# Patient Record
Sex: FEMALE | Race: WHITE | NOT HISPANIC OR LATINO | URBAN - METROPOLITAN AREA
[De-identification: names, ages, dates, MRNs, and addresses within clinical notes are randomized per-mention and may not be internally consistent; named-entity substitution may affect disease eponyms.]

---

## 2022-05-13 ENCOUNTER — OFFICE (OUTPATIENT)
Dept: URBAN - METROPOLITAN AREA CLINIC 64 | Facility: CLINIC | Age: 52
End: 2022-05-13

## 2022-05-13 VITALS
DIASTOLIC BLOOD PRESSURE: 89 MMHG | SYSTOLIC BLOOD PRESSURE: 131 MMHG | HEART RATE: 94 BPM | WEIGHT: 233 LBS | HEIGHT: 68 IN

## 2022-05-13 DIAGNOSIS — K21.9 GASTRO-ESOPHAGEAL REFLUX DISEASE WITHOUT ESOPHAGITIS: ICD-10-CM

## 2022-05-13 PROCEDURE — 99204 OFFICE O/P NEW MOD 45 MIN: CPT | Performed by: INTERNAL MEDICINE

## 2022-05-13 RX ORDER — NALOXEGOL OXALATE 25 MG/1
25 TABLET, FILM COATED ORAL
Qty: 30 | Refills: 11 | Status: ACTIVE
Start: 2022-05-13

## 2022-05-31 ENCOUNTER — APPOINTMENT (OUTPATIENT)
Dept: CT IMAGING | Facility: HOSPITAL | Age: 52
End: 2022-05-31

## 2022-05-31 ENCOUNTER — HOSPITAL ENCOUNTER (OUTPATIENT)
Facility: HOSPITAL | Age: 52
Setting detail: OBSERVATION
Discharge: HOME | End: 2022-06-01
Attending: EMERGENCY MEDICINE | Admitting: INTERNAL MEDICINE

## 2022-05-31 ENCOUNTER — APPOINTMENT (OUTPATIENT)
Dept: GENERAL RADIOLOGY | Facility: HOSPITAL | Age: 52
End: 2022-05-31

## 2022-05-31 DIAGNOSIS — N39.0 URINARY TRACT INFECTION WITHOUT HEMATURIA, SITE UNSPECIFIED: ICD-10-CM

## 2022-05-31 DIAGNOSIS — R47.81 SLURRED SPEECH: ICD-10-CM

## 2022-05-31 DIAGNOSIS — R53.1 WEAKNESS: Primary | ICD-10-CM

## 2022-05-31 PROBLEM — K21.9 GERD WITHOUT ESOPHAGITIS: Chronic | Status: ACTIVE | Noted: 2022-05-31

## 2022-05-31 PROBLEM — Z72.0 TOBACCO ABUSE: Chronic | Status: ACTIVE | Noted: 2022-05-31

## 2022-05-31 LAB
ALBUMIN SERPL-MCNC: 3.7 G/DL (ref 3.5–5.2)
ALBUMIN/GLOB SERPL: 1.3 G/DL
ALP SERPL-CCNC: 108 U/L (ref 39–117)
ALT SERPL W P-5'-P-CCNC: 33 U/L (ref 1–33)
ANION GAP SERPL CALCULATED.3IONS-SCNC: 11 MMOL/L (ref 5–15)
ANION GAP SERPL CALCULATED.3IONS-SCNC: 12 MMOL/L (ref 5–15)
AST SERPL-CCNC: 25 U/L (ref 1–32)
B PARAPERT DNA SPEC QL NAA+PROBE: NOT DETECTED
B PERT DNA SPEC QL NAA+PROBE: NOT DETECTED
BACTERIA UR QL AUTO: ABNORMAL /HPF
BASOPHILS # BLD AUTO: 0 10*3/MM3 (ref 0–0.2)
BASOPHILS NFR BLD AUTO: 0.3 % (ref 0–1.5)
BILIRUB SERPL-MCNC: 0.4 MG/DL (ref 0–1.2)
BILIRUB UR QL STRIP: NEGATIVE
BUN SERPL-MCNC: 8 MG/DL (ref 6–20)
BUN SERPL-MCNC: 8 MG/DL (ref 6–20)
BUN/CREAT SERPL: 7.2 (ref 7–25)
BUN/CREAT SERPL: 9.3 (ref 7–25)
C PNEUM DNA NPH QL NAA+NON-PROBE: NOT DETECTED
CALCIUM SPEC-SCNC: 8.8 MG/DL (ref 8.6–10.5)
CALCIUM SPEC-SCNC: 9 MG/DL (ref 8.6–10.5)
CHLORIDE SERPL-SCNC: 101 MMOL/L (ref 98–107)
CHLORIDE SERPL-SCNC: 105 MMOL/L (ref 98–107)
CHOLEST SERPL-MCNC: 248 MG/DL (ref 0–200)
CK SERPL-CCNC: 365 U/L (ref 20–180)
CLARITY UR: ABNORMAL
CO2 SERPL-SCNC: 23 MMOL/L (ref 22–29)
CO2 SERPL-SCNC: 23 MMOL/L (ref 22–29)
COLOR UR: ABNORMAL
CREAT SERPL-MCNC: 0.86 MG/DL (ref 0.57–1)
CREAT SERPL-MCNC: 1.11 MG/DL (ref 0.57–1)
D-LACTATE SERPL-SCNC: 0.4 MMOL/L (ref 0.5–2)
D-LACTATE SERPL-SCNC: 0.8 MMOL/L (ref 0.5–2)
DEPRECATED RDW RBC AUTO: 43.8 FL (ref 37–54)
EGFRCR SERPLBLD CKD-EPI 2021: 59.9 ML/MIN/1.73
EGFRCR SERPLBLD CKD-EPI 2021: 81.4 ML/MIN/1.73
EOSINOPHIL # BLD AUTO: 0.4 10*3/MM3 (ref 0–0.4)
EOSINOPHIL NFR BLD AUTO: 2.8 % (ref 0.3–6.2)
ERYTHROCYTE [DISTWIDTH] IN BLOOD BY AUTOMATED COUNT: 13.9 % (ref 12.3–15.4)
FLUAV SUBTYP SPEC NAA+PROBE: NOT DETECTED
FLUBV RNA ISLT QL NAA+PROBE: NOT DETECTED
GLOBULIN UR ELPH-MCNC: 2.9 GM/DL
GLUCOSE SERPL-MCNC: 109 MG/DL (ref 65–99)
GLUCOSE SERPL-MCNC: 92 MG/DL (ref 65–99)
GLUCOSE UR STRIP-MCNC: NEGATIVE MG/DL
HADV DNA SPEC NAA+PROBE: NOT DETECTED
HCOV 229E RNA SPEC QL NAA+PROBE: NOT DETECTED
HCOV HKU1 RNA SPEC QL NAA+PROBE: NOT DETECTED
HCOV NL63 RNA SPEC QL NAA+PROBE: NOT DETECTED
HCOV OC43 RNA SPEC QL NAA+PROBE: NOT DETECTED
HCT VFR BLD AUTO: 37 % (ref 34–46.6)
HDLC SERPL-MCNC: 48 MG/DL (ref 40–60)
HGB BLD-MCNC: 12.1 G/DL (ref 12–15.9)
HGB UR QL STRIP.AUTO: NEGATIVE
HMPV RNA NPH QL NAA+NON-PROBE: NOT DETECTED
HPIV1 RNA ISLT QL NAA+PROBE: NOT DETECTED
HPIV2 RNA SPEC QL NAA+PROBE: NOT DETECTED
HPIV3 RNA NPH QL NAA+PROBE: NOT DETECTED
HPIV4 P GENE NPH QL NAA+PROBE: NOT DETECTED
HYALINE CASTS UR QL AUTO: ABNORMAL /LPF
KETONES UR QL STRIP: ABNORMAL
LDLC SERPL CALC-MCNC: 168 MG/DL (ref 0–100)
LDLC/HDLC SERPL: 3.44 {RATIO}
LEUKOCYTE ESTERASE UR QL STRIP.AUTO: ABNORMAL
LYMPHOCYTES # BLD AUTO: 2.4 10*3/MM3 (ref 0.7–3.1)
LYMPHOCYTES NFR BLD AUTO: 17.3 % (ref 19.6–45.3)
M PNEUMO IGG SER IA-ACNC: NOT DETECTED
MAGNESIUM SERPL-MCNC: 1.8 MG/DL (ref 1.6–2.6)
MCH RBC QN AUTO: 29.2 PG (ref 26.6–33)
MCHC RBC AUTO-ENTMCNC: 32.6 G/DL (ref 31.5–35.7)
MCV RBC AUTO: 89.6 FL (ref 79–97)
MONOCYTES # BLD AUTO: 0.7 10*3/MM3 (ref 0.1–0.9)
MONOCYTES NFR BLD AUTO: 5.3 % (ref 5–12)
NEUTROPHILS NFR BLD AUTO: 10.4 10*3/MM3 (ref 1.7–7)
NEUTROPHILS NFR BLD AUTO: 74.3 % (ref 42.7–76)
NITRITE UR QL STRIP: POSITIVE
NRBC BLD AUTO-RTO: 0.2 /100 WBC (ref 0–0.2)
NT-PROBNP SERPL-MCNC: 357.5 PG/ML (ref 0–900)
PH UR STRIP.AUTO: 5.5 [PH] (ref 5–8)
PHOSPHATE SERPL-MCNC: 2.8 MG/DL (ref 2.5–4.5)
PLATELET # BLD AUTO: 193 10*3/MM3 (ref 140–450)
PMV BLD AUTO: 8.5 FL (ref 6–12)
POTASSIUM SERPL-SCNC: 3.4 MMOL/L (ref 3.5–5.2)
POTASSIUM SERPL-SCNC: 3.6 MMOL/L (ref 3.5–5.2)
PROCALCITONIN SERPL-MCNC: 0.92 NG/ML (ref 0–0.25)
PROT SERPL-MCNC: 6.6 G/DL (ref 6–8.5)
PROT UR QL STRIP: ABNORMAL
RBC # BLD AUTO: 4.13 10*6/MM3 (ref 3.77–5.28)
RBC # UR STRIP: ABNORMAL /HPF
REF LAB TEST METHOD: ABNORMAL
RHINOVIRUS RNA SPEC NAA+PROBE: NOT DETECTED
RSV RNA NPH QL NAA+NON-PROBE: NOT DETECTED
SARS-COV-2 RNA NPH QL NAA+NON-PROBE: NOT DETECTED
SODIUM SERPL-SCNC: 136 MMOL/L (ref 136–145)
SODIUM SERPL-SCNC: 139 MMOL/L (ref 136–145)
SP GR UR STRIP: 1.02 (ref 1–1.03)
SQUAMOUS #/AREA URNS HPF: ABNORMAL /HPF
TRIGL SERPL-MCNC: 175 MG/DL (ref 0–150)
TROPONIN T SERPL-MCNC: <0.01 NG/ML (ref 0–0.03)
TSH SERPL DL<=0.05 MIU/L-ACNC: 1.4 UIU/ML (ref 0.27–4.2)
UROBILINOGEN UR QL STRIP: ABNORMAL
VLDLC SERPL-MCNC: 32 MG/DL (ref 5–40)
WBC # UR STRIP: ABNORMAL /HPF
WBC NRBC COR # BLD: 14.1 10*3/MM3 (ref 3.4–10.8)

## 2022-05-31 PROCEDURE — 99285 EMERGENCY DEPT VISIT HI MDM: CPT

## 2022-05-31 PROCEDURE — G0378 HOSPITAL OBSERVATION PER HR: HCPCS

## 2022-05-31 PROCEDURE — 83605 ASSAY OF LACTIC ACID: CPT | Performed by: INTERNAL MEDICINE

## 2022-05-31 PROCEDURE — 96375 TX/PRO/DX INJ NEW DRUG ADDON: CPT

## 2022-05-31 PROCEDURE — 80048 BASIC METABOLIC PNL TOTAL CA: CPT | Performed by: INTERNAL MEDICINE

## 2022-05-31 PROCEDURE — 83036 HEMOGLOBIN GLYCOSYLATED A1C: CPT | Performed by: INTERNAL MEDICINE

## 2022-05-31 PROCEDURE — 25010000002 CEFTRIAXONE PER 250 MG: Performed by: EMERGENCY MEDICINE

## 2022-05-31 PROCEDURE — 70450 CT HEAD/BRAIN W/O DYE: CPT

## 2022-05-31 PROCEDURE — 84484 ASSAY OF TROPONIN QUANT: CPT | Performed by: EMERGENCY MEDICINE

## 2022-05-31 PROCEDURE — 82550 ASSAY OF CK (CPK): CPT | Performed by: INTERNAL MEDICINE

## 2022-05-31 PROCEDURE — 87086 URINE CULTURE/COLONY COUNT: CPT | Performed by: EMERGENCY MEDICINE

## 2022-05-31 PROCEDURE — 25010000002 METOCLOPRAMIDE PER 10 MG: Performed by: EMERGENCY MEDICINE

## 2022-05-31 PROCEDURE — 87088 URINE BACTERIA CULTURE: CPT | Performed by: EMERGENCY MEDICINE

## 2022-05-31 PROCEDURE — 83735 ASSAY OF MAGNESIUM: CPT | Performed by: INTERNAL MEDICINE

## 2022-05-31 PROCEDURE — 93005 ELECTROCARDIOGRAM TRACING: CPT | Performed by: EMERGENCY MEDICINE

## 2022-05-31 PROCEDURE — 83605 ASSAY OF LACTIC ACID: CPT

## 2022-05-31 PROCEDURE — 99219 PR INITIAL OBSERVATION CARE/DAY 50 MINUTES: CPT | Performed by: INTERNAL MEDICINE

## 2022-05-31 PROCEDURE — 87186 SC STD MICRODIL/AGAR DIL: CPT | Performed by: EMERGENCY MEDICINE

## 2022-05-31 PROCEDURE — 71045 X-RAY EXAM CHEST 1 VIEW: CPT

## 2022-05-31 PROCEDURE — 0202U NFCT DS 22 TRGT SARS-COV-2: CPT | Performed by: EMERGENCY MEDICINE

## 2022-05-31 PROCEDURE — 80050 GENERAL HEALTH PANEL: CPT | Performed by: INTERNAL MEDICINE

## 2022-05-31 PROCEDURE — 84100 ASSAY OF PHOSPHORUS: CPT | Performed by: INTERNAL MEDICINE

## 2022-05-31 PROCEDURE — 81001 URINALYSIS AUTO W/SCOPE: CPT | Performed by: EMERGENCY MEDICINE

## 2022-05-31 PROCEDURE — 83880 ASSAY OF NATRIURETIC PEPTIDE: CPT | Performed by: INTERNAL MEDICINE

## 2022-05-31 PROCEDURE — 87040 BLOOD CULTURE FOR BACTERIA: CPT | Performed by: EMERGENCY MEDICINE

## 2022-05-31 PROCEDURE — 96374 THER/PROPH/DIAG INJ IV PUSH: CPT

## 2022-05-31 PROCEDURE — 84145 PROCALCITONIN (PCT): CPT | Performed by: INTERNAL MEDICINE

## 2022-05-31 PROCEDURE — 25010000002 ONDANSETRON PER 1 MG: Performed by: EMERGENCY MEDICINE

## 2022-05-31 PROCEDURE — 80061 LIPID PANEL: CPT | Performed by: INTERNAL MEDICINE

## 2022-05-31 RX ORDER — AMOXICILLIN 250 MG
2 CAPSULE ORAL 2 TIMES DAILY
Status: DISCONTINUED | OUTPATIENT
Start: 2022-05-31 | End: 2022-06-01 | Stop reason: HOSPADM

## 2022-05-31 RX ORDER — ROSUVASTATIN CALCIUM 10 MG/1
10 TABLET, COATED ORAL DAILY
COMMUNITY
End: 2022-06-01 | Stop reason: HOSPADM

## 2022-05-31 RX ORDER — SODIUM CHLORIDE 0.9 % (FLUSH) 0.9 %
10 SYRINGE (ML) INJECTION AS NEEDED
Status: DISCONTINUED | OUTPATIENT
Start: 2022-05-31 | End: 2022-06-01 | Stop reason: HOSPADM

## 2022-05-31 RX ORDER — PROMETHAZINE HYDROCHLORIDE 25 MG/1
25 TABLET ORAL 3 TIMES DAILY PRN
COMMUNITY

## 2022-05-31 RX ORDER — OXYCODONE AND ACETAMINOPHEN 10; 325 MG/1; MG/1
1 TABLET ORAL EVERY 4 HOURS PRN
COMMUNITY

## 2022-05-31 RX ORDER — ONDANSETRON 2 MG/ML
8 INJECTION INTRAMUSCULAR; INTRAVENOUS ONCE
Status: COMPLETED | OUTPATIENT
Start: 2022-05-31 | End: 2022-05-31

## 2022-05-31 RX ORDER — AMITRIPTYLINE HYDROCHLORIDE 150 MG/1
300 TABLET, FILM COATED ORAL NIGHTLY
COMMUNITY

## 2022-05-31 RX ORDER — DICYCLOMINE HYDROCHLORIDE 10 MG/1
10 CAPSULE ORAL 4 TIMES DAILY
COMMUNITY

## 2022-05-31 RX ORDER — TIZANIDINE HYDROCHLORIDE 4 MG/1
4 CAPSULE, GELATIN COATED ORAL EVERY 8 HOURS
COMMUNITY

## 2022-05-31 RX ORDER — SUMATRIPTAN 100 MG/1
100 TABLET, FILM COATED ORAL 2 TIMES DAILY PRN
COMMUNITY

## 2022-05-31 RX ORDER — ASPIRIN 325 MG
325 TABLET ORAL ONCE
Status: COMPLETED | OUTPATIENT
Start: 2022-05-31 | End: 2022-05-31

## 2022-05-31 RX ORDER — ENOXAPARIN SODIUM 100 MG/ML
40 INJECTION SUBCUTANEOUS
Status: DISCONTINUED | OUTPATIENT
Start: 2022-05-31 | End: 2022-06-01 | Stop reason: HOSPADM

## 2022-05-31 RX ORDER — HYDROCODONE BITARTRATE AND ACETAMINOPHEN 5; 325 MG/1; MG/1
1 TABLET ORAL EVERY 4 HOURS PRN
Status: DISCONTINUED | OUTPATIENT
Start: 2022-05-31 | End: 2022-06-01 | Stop reason: HOSPADM

## 2022-05-31 RX ORDER — NITROGLYCERIN 0.4 MG/1
0.4 TABLET SUBLINGUAL
Status: DISCONTINUED | OUTPATIENT
Start: 2022-05-31 | End: 2022-06-01 | Stop reason: HOSPADM

## 2022-05-31 RX ORDER — NALOXEGOL OXALATE 25 MG/1
25 TABLET, FILM COATED ORAL EVERY MORNING
COMMUNITY

## 2022-05-31 RX ORDER — GABAPENTIN 600 MG/1
600 TABLET ORAL 3 TIMES DAILY
COMMUNITY

## 2022-05-31 RX ORDER — BISACODYL 10 MG
10 SUPPOSITORY, RECTAL RECTAL DAILY PRN
Status: DISCONTINUED | OUTPATIENT
Start: 2022-05-31 | End: 2022-06-01 | Stop reason: HOSPADM

## 2022-05-31 RX ORDER — ESCITALOPRAM OXALATE 20 MG/1
20 TABLET ORAL NIGHTLY
COMMUNITY
Start: 2014-12-15

## 2022-05-31 RX ORDER — BISACODYL 5 MG/1
5 TABLET, DELAYED RELEASE ORAL DAILY PRN
Status: DISCONTINUED | OUTPATIENT
Start: 2022-05-31 | End: 2022-06-01 | Stop reason: HOSPADM

## 2022-05-31 RX ORDER — HYDROXYZINE PAMOATE 100 MG/1
200 CAPSULE ORAL NIGHTLY
COMMUNITY
End: 2022-06-01 | Stop reason: HOSPADM

## 2022-05-31 RX ORDER — POLYETHYLENE GLYCOL 3350 17 G/17G
17 POWDER, FOR SOLUTION ORAL DAILY PRN
Status: DISCONTINUED | OUTPATIENT
Start: 2022-05-31 | End: 2022-06-01 | Stop reason: HOSPADM

## 2022-05-31 RX ORDER — FAMOTIDINE 20 MG/1
40 TABLET, FILM COATED ORAL DAILY
Status: DISCONTINUED | OUTPATIENT
Start: 2022-06-01 | End: 2022-06-01 | Stop reason: HOSPADM

## 2022-05-31 RX ORDER — METOCLOPRAMIDE HYDROCHLORIDE 5 MG/ML
10 INJECTION INTRAMUSCULAR; INTRAVENOUS ONCE
Status: COMPLETED | OUTPATIENT
Start: 2022-05-31 | End: 2022-05-31

## 2022-05-31 RX ORDER — SUCRALFATE 1 G/1
1 TABLET ORAL 4 TIMES DAILY
COMMUNITY

## 2022-05-31 RX ORDER — SODIUM CHLORIDE 0.9 % (FLUSH) 0.9 %
10 SYRINGE (ML) INJECTION EVERY 12 HOURS SCHEDULED
Status: DISCONTINUED | OUTPATIENT
Start: 2022-05-31 | End: 2022-06-01 | Stop reason: HOSPADM

## 2022-05-31 RX ORDER — DULOXETIN HYDROCHLORIDE 30 MG/1
90 CAPSULE, DELAYED RELEASE ORAL 3 TIMES DAILY
COMMUNITY

## 2022-05-31 RX ORDER — TOPIRAMATE 50 MG/1
50 TABLET, FILM COATED ORAL DAILY
COMMUNITY

## 2022-05-31 RX ORDER — ONDANSETRON 2 MG/ML
4 INJECTION INTRAMUSCULAR; INTRAVENOUS EVERY 6 HOURS PRN
Status: DISCONTINUED | OUTPATIENT
Start: 2022-05-31 | End: 2022-06-01 | Stop reason: HOSPADM

## 2022-05-31 RX ORDER — ESTRADIOL 1 MG/1
1 TABLET ORAL EVERY MORNING
COMMUNITY

## 2022-05-31 RX ORDER — CHOLECALCIFEROL (VITAMIN D3) 125 MCG
5 CAPSULE ORAL NIGHTLY PRN
Status: DISCONTINUED | OUTPATIENT
Start: 2022-05-31 | End: 2022-06-01 | Stop reason: HOSPADM

## 2022-05-31 RX ADMIN — SODIUM CHLORIDE, POTASSIUM CHLORIDE, SODIUM LACTATE AND CALCIUM CHLORIDE 1000 ML: 600; 310; 30; 20 INJECTION, SOLUTION INTRAVENOUS at 12:44

## 2022-05-31 RX ADMIN — ASPIRIN 325 MG ORAL TABLET 325 MG: 325 PILL ORAL at 16:17

## 2022-05-31 RX ADMIN — WATER 1 G: 100 INJECTION, SOLUTION INTRAVENOUS at 14:57

## 2022-05-31 RX ADMIN — METOCLOPRAMIDE 10 MG: 5 INJECTION, SOLUTION INTRAMUSCULAR; INTRAVENOUS at 16:17

## 2022-05-31 RX ADMIN — HYDROCODONE BITARTRATE AND ACETAMINOPHEN 1 TABLET: 5; 325 TABLET ORAL at 22:42

## 2022-05-31 RX ADMIN — ONDANSETRON 8 MG: 2 INJECTION INTRAMUSCULAR; INTRAVENOUS at 14:53

## 2022-06-01 ENCOUNTER — APPOINTMENT (OUTPATIENT)
Dept: CT IMAGING | Facility: HOSPITAL | Age: 52
End: 2022-06-01

## 2022-06-01 ENCOUNTER — APPOINTMENT (OUTPATIENT)
Dept: CARDIOLOGY | Facility: HOSPITAL | Age: 52
End: 2022-06-01

## 2022-06-01 ENCOUNTER — APPOINTMENT (OUTPATIENT)
Dept: MRI IMAGING | Facility: HOSPITAL | Age: 52
End: 2022-06-01

## 2022-06-01 VITALS
WEIGHT: 242 LBS | SYSTOLIC BLOOD PRESSURE: 142 MMHG | HEART RATE: 86 BPM | DIASTOLIC BLOOD PRESSURE: 76 MMHG | OXYGEN SATURATION: 92 % | TEMPERATURE: 98.1 F | RESPIRATION RATE: 23 BRPM | HEIGHT: 68 IN | BODY MASS INDEX: 36.68 KG/M2

## 2022-06-01 LAB
BH CV ECHO MEAS - AO MAX PG: 12.2 MMHG
BH CV ECHO MEAS - AO MEAN PG: 6.7 MMHG
BH CV ECHO MEAS - AO ROOT DIAM: 3.5 CM
BH CV ECHO MEAS - AO V2 MAX: 174.5 CM/SEC
BH CV ECHO MEAS - AO V2 VTI: 35 CM
BH CV ECHO MEAS - AVA(I,D): 1.57 CM2
BH CV ECHO MEAS - EDV(CUBED): 139.8 ML
BH CV ECHO MEAS - EDV(MOD-SP4): 96.6 ML
BH CV ECHO MEAS - EF(MOD-SP4): 51.9 %
BH CV ECHO MEAS - ESV(CUBED): 57.8 ML
BH CV ECHO MEAS - ESV(MOD-SP4): 46.4 ML
BH CV ECHO MEAS - FS: 25.5 %
BH CV ECHO MEAS - IVS/LVPW: 0.96 CM
BH CV ECHO MEAS - IVSD: 1.08 CM
BH CV ECHO MEAS - LA DIMENSION: 4.2 CM
BH CV ECHO MEAS - LV DIASTOLIC VOL/BSA (35-75): 43.6 CM2
BH CV ECHO MEAS - LV MASS(C)D: 219.7 GRAMS
BH CV ECHO MEAS - LV MAX PG: 2.6 MMHG
BH CV ECHO MEAS - LV MEAN PG: 1.37 MMHG
BH CV ECHO MEAS - LV SYSTOLIC VOL/BSA (12-30): 20.9 CM2
BH CV ECHO MEAS - LV V1 MAX: 80.6 CM/SEC
BH CV ECHO MEAS - LV V1 VTI: 15.2 CM
BH CV ECHO MEAS - LVIDD: 5.2 CM
BH CV ECHO MEAS - LVIDS: 3.9 CM
BH CV ECHO MEAS - LVOT AREA: 3.6 CM2
BH CV ECHO MEAS - LVOT DIAM: 2.14 CM
BH CV ECHO MEAS - LVPWD: 1.12 CM
BH CV ECHO MEAS - MR MAX PG: 91.6 MMHG
BH CV ECHO MEAS - MR MAX VEL: 478.4 CM/SEC
BH CV ECHO MEAS - MV A MAX VEL: 75.1 CM/SEC
BH CV ECHO MEAS - MV DEC SLOPE: 664.4 CM/SEC2
BH CV ECHO MEAS - MV DEC TIME: 0.13 MSEC
BH CV ECHO MEAS - MV E MAX VEL: 85.5 CM/SEC
BH CV ECHO MEAS - MV E/A: 1.14
BH CV ECHO MEAS - MV MAX PG: 4.4 MMHG
BH CV ECHO MEAS - MV MEAN PG: 2.19 MMHG
BH CV ECHO MEAS - MV V2 VTI: 27.8 CM
BH CV ECHO MEAS - MVA(VTI): 1.97 CM2
BH CV ECHO MEAS - PA V2 MAX: 92.7 CM/SEC
BH CV ECHO MEAS - PULM A REVS DUR: 0.11 SEC
BH CV ECHO MEAS - PULM A REVS VEL: 24 CM/SEC
BH CV ECHO MEAS - PULM DIAS VEL: 51.8 CM/SEC
BH CV ECHO MEAS - PULM S/D: 1.36
BH CV ECHO MEAS - PULM SYS VEL: 70.3 CM/SEC
BH CV ECHO MEAS - RAP SYSTOLE: 8 MMHG
BH CV ECHO MEAS - RV MAX PG: 1.2 MMHG
BH CV ECHO MEAS - RV V1 MAX: 54.7 CM/SEC
BH CV ECHO MEAS - RV V1 VTI: 11 CM
BH CV ECHO MEAS - RVDD: 2.7 CM
BH CV ECHO MEAS - RVSP: 14.4 MMHG
BH CV ECHO MEAS - SI(MOD-SP4): 22.6 ML/M2
BH CV ECHO MEAS - SV(LVOT): 54.9 ML
BH CV ECHO MEAS - SV(MOD-SP4): 50.2 ML
BH CV ECHO MEAS - TR MAX PG: 6.4 MMHG
BH CV ECHO MEAS - TR MAX VEL: 126.7 CM/SEC
HBA1C MFR BLD: 5.8 % (ref 3.5–5.6)
MAXIMAL PREDICTED HEART RATE: 168 BPM
STRESS TARGET HR: 143 BPM

## 2022-06-01 PROCEDURE — G0378 HOSPITAL OBSERVATION PER HR: HCPCS

## 2022-06-01 PROCEDURE — 97162 PT EVAL MOD COMPLEX 30 MIN: CPT

## 2022-06-01 PROCEDURE — 70496 CT ANGIOGRAPHY HEAD: CPT

## 2022-06-01 PROCEDURE — 99204 OFFICE O/P NEW MOD 45 MIN: CPT | Performed by: PSYCHIATRY & NEUROLOGY

## 2022-06-01 PROCEDURE — 93306 TTE W/DOPPLER COMPLETE: CPT | Performed by: INTERNAL MEDICINE

## 2022-06-01 PROCEDURE — 93306 TTE W/DOPPLER COMPLETE: CPT

## 2022-06-01 PROCEDURE — 70551 MRI BRAIN STEM W/O DYE: CPT

## 2022-06-01 PROCEDURE — 99217 PR OBSERVATION CARE DISCHARGE MANAGEMENT: CPT | Performed by: INTERNAL MEDICINE

## 2022-06-01 PROCEDURE — 0 IOPAMIDOL PER 1 ML: Performed by: PSYCHIATRY & NEUROLOGY

## 2022-06-01 PROCEDURE — 70498 CT ANGIOGRAPHY NECK: CPT

## 2022-06-01 PROCEDURE — 96372 THER/PROPH/DIAG INJ SC/IM: CPT

## 2022-06-01 PROCEDURE — 25010000002 ENOXAPARIN PER 10 MG: Performed by: INTERNAL MEDICINE

## 2022-06-01 RX ORDER — RISPERIDONE 1 MG/1
1 TABLET ORAL NIGHTLY
COMMUNITY
End: 2022-06-01 | Stop reason: HOSPADM

## 2022-06-01 RX ORDER — DICYCLOMINE HYDROCHLORIDE 10 MG/1
10 CAPSULE ORAL 4 TIMES DAILY
Status: DISCONTINUED | OUTPATIENT
Start: 2022-06-01 | End: 2022-06-01 | Stop reason: HOSPADM

## 2022-06-01 RX ORDER — CEFDINIR 300 MG/1
300 CAPSULE ORAL 2 TIMES DAILY
Qty: 14 CAPSULE | Refills: 0 | Status: SHIPPED | OUTPATIENT
Start: 2022-06-01

## 2022-06-01 RX ORDER — POTASSIUM CHLORIDE 750 MG/1
10 TABLET, FILM COATED, EXTENDED RELEASE ORAL 2 TIMES DAILY
Qty: 14 TABLET | Refills: 0 | Status: SHIPPED | OUTPATIENT
Start: 2022-06-01

## 2022-06-01 RX ORDER — ESCITALOPRAM OXALATE 10 MG/1
20 TABLET ORAL NIGHTLY
Status: DISCONTINUED | OUTPATIENT
Start: 2022-06-01 | End: 2022-06-01

## 2022-06-01 RX ORDER — AMITRIPTYLINE HYDROCHLORIDE 50 MG/1
300 TABLET, FILM COATED ORAL NIGHTLY
Status: DISCONTINUED | OUTPATIENT
Start: 2022-06-01 | End: 2022-06-01

## 2022-06-01 RX ORDER — HYDROXYZINE HYDROCHLORIDE 25 MG/1
100 TABLET, FILM COATED ORAL NIGHTLY
Status: DISCONTINUED | OUTPATIENT
Start: 2022-06-01 | End: 2022-06-01 | Stop reason: HOSPADM

## 2022-06-01 RX ORDER — ATORVASTATIN CALCIUM 80 MG/1
80 TABLET, FILM COATED ORAL DAILY
Qty: 90 TABLET | Refills: 2 | Status: SHIPPED | OUTPATIENT
Start: 2022-06-01

## 2022-06-01 RX ORDER — ESTRADIOL 1 MG/1
1 TABLET ORAL EVERY MORNING
Status: DISCONTINUED | OUTPATIENT
Start: 2022-06-02 | End: 2022-06-01 | Stop reason: HOSPADM

## 2022-06-01 RX ORDER — MAGNESIUM OXIDE 400 MG/1
400 TABLET ORAL DAILY
Qty: 30 TABLET | Refills: 0 | Status: SHIPPED | OUTPATIENT
Start: 2022-06-01

## 2022-06-01 RX ORDER — MAGNESIUM OXIDE 400 MG/1
400 TABLET ORAL DAILY
Qty: 30 TABLET | Refills: 0 | Status: SHIPPED | OUTPATIENT
Start: 2022-06-01 | End: 2022-06-01 | Stop reason: SDUPTHER

## 2022-06-01 RX ORDER — PROMETHAZINE HYDROCHLORIDE 25 MG/1
25 TABLET ORAL 3 TIMES DAILY PRN
Status: DISCONTINUED | OUTPATIENT
Start: 2022-06-01 | End: 2022-06-01 | Stop reason: HOSPADM

## 2022-06-01 RX ORDER — GABAPENTIN 600 MG/1
600 TABLET ORAL 3 TIMES DAILY
Status: DISCONTINUED | OUTPATIENT
Start: 2022-06-01 | End: 2022-06-01 | Stop reason: HOSPADM

## 2022-06-01 RX ORDER — OLANZAPINE 10 MG/1
20 TABLET ORAL NIGHTLY
COMMUNITY

## 2022-06-01 RX ORDER — RISPERIDONE 1 MG/1
1 TABLET ORAL NIGHTLY
Status: DISCONTINUED | OUTPATIENT
Start: 2022-06-01 | End: 2022-06-01

## 2022-06-01 RX ORDER — CEFDINIR 300 MG/1
300 CAPSULE ORAL 2 TIMES DAILY
Qty: 14 CAPSULE | Refills: 0 | Status: SHIPPED | OUTPATIENT
Start: 2022-06-01 | End: 2022-06-01 | Stop reason: SDUPTHER

## 2022-06-01 RX ORDER — OMEPRAZOLE 40 MG/1
40 CAPSULE, DELAYED RELEASE ORAL 2 TIMES DAILY
COMMUNITY

## 2022-06-01 RX ORDER — IBUPROFEN 800 MG/1
800 TABLET ORAL EVERY 8 HOURS PRN
COMMUNITY
End: 2022-06-01 | Stop reason: HOSPADM

## 2022-06-01 RX ORDER — SUCRALFATE 1 G/1
1 TABLET ORAL 4 TIMES DAILY
Status: DISCONTINUED | OUTPATIENT
Start: 2022-06-01 | End: 2022-06-01 | Stop reason: HOSPADM

## 2022-06-01 RX ORDER — PANTOPRAZOLE SODIUM 40 MG/1
40 TABLET, DELAYED RELEASE ORAL EVERY MORNING
Status: DISCONTINUED | OUTPATIENT
Start: 2022-06-02 | End: 2022-06-01 | Stop reason: HOSPADM

## 2022-06-01 RX ORDER — ASPIRIN 325 MG
325 TABLET, DELAYED RELEASE (ENTERIC COATED) ORAL DAILY
Qty: 30 TABLET | Refills: 0 | Status: SHIPPED | OUTPATIENT
Start: 2022-06-01 | End: 2022-06-01 | Stop reason: SDUPTHER

## 2022-06-01 RX ORDER — ASPIRIN 325 MG
325 TABLET, DELAYED RELEASE (ENTERIC COATED) ORAL DAILY
Qty: 30 TABLET | Refills: 0 | Status: SHIPPED | OUTPATIENT
Start: 2022-06-01

## 2022-06-01 RX ORDER — OXYCODONE HYDROCHLORIDE 5 MG/1
10 TABLET ORAL EVERY 4 HOURS PRN
Status: DISCONTINUED | OUTPATIENT
Start: 2022-06-01 | End: 2022-06-01 | Stop reason: HOSPADM

## 2022-06-01 RX ORDER — TOPIRAMATE 25 MG/1
50 TABLET ORAL DAILY
Status: DISCONTINUED | OUTPATIENT
Start: 2022-06-01 | End: 2022-06-01 | Stop reason: HOSPADM

## 2022-06-01 RX ORDER — OLANZAPINE 10 MG/1
20 TABLET ORAL NIGHTLY
Status: DISCONTINUED | OUTPATIENT
Start: 2022-06-01 | End: 2022-06-01

## 2022-06-01 RX ORDER — TIZANIDINE 4 MG/1
4 TABLET ORAL EVERY 8 HOURS SCHEDULED
Status: DISCONTINUED | OUTPATIENT
Start: 2022-06-01 | End: 2022-06-01 | Stop reason: HOSPADM

## 2022-06-01 RX ORDER — DULOXETIN HYDROCHLORIDE 30 MG/1
90 CAPSULE, DELAYED RELEASE ORAL 3 TIMES DAILY
Status: DISCONTINUED | OUTPATIENT
Start: 2022-06-01 | End: 2022-06-01 | Stop reason: HOSPADM

## 2022-06-01 RX ORDER — ROSUVASTATIN CALCIUM 10 MG/1
10 TABLET, COATED ORAL DAILY
Status: DISCONTINUED | OUTPATIENT
Start: 2022-06-01 | End: 2022-06-01 | Stop reason: HOSPADM

## 2022-06-01 RX ORDER — POTASSIUM CHLORIDE 750 MG/1
10 TABLET, FILM COATED, EXTENDED RELEASE ORAL 2 TIMES DAILY
Qty: 14 TABLET | Refills: 0 | Status: SHIPPED | OUTPATIENT
Start: 2022-06-01 | End: 2022-06-01 | Stop reason: SDUPTHER

## 2022-06-01 RX ADMIN — HYDROCODONE BITARTRATE AND ACETAMINOPHEN 1 TABLET: 5; 325 TABLET ORAL at 04:13

## 2022-06-01 RX ADMIN — HYDROCODONE BITARTRATE AND ACETAMINOPHEN 1 TABLET: 5; 325 TABLET ORAL at 13:34

## 2022-06-01 RX ADMIN — FAMOTIDINE 40 MG: 20 TABLET ORAL at 10:40

## 2022-06-01 RX ADMIN — ENOXAPARIN SODIUM 40 MG: 100 INJECTION SUBCUTANEOUS at 16:36

## 2022-06-01 RX ADMIN — Medication 10 ML: at 10:41

## 2022-06-01 RX ADMIN — IOPAMIDOL 100 ML: 755 INJECTION, SOLUTION INTRAVENOUS at 11:58

## 2022-06-01 NOTE — NURSING NOTE
"Stroke packet brought into the room, personal risk factors for stroke identified and discussed with the patient include:    Former smoker 41 years 2 to 2.5 packs a day  Elevated blood pressure  Elevated total cholesterol 248, normal 200 or less  Elevated LDL or \"bad\" cholesterol 168, normla is 100 or less  Elevated body mass index is 36, normal is 18-25.    "

## 2022-06-01 NOTE — CASE MANAGEMENT/SOCIAL WORK
Discharge Planning Assessment  [facility]     Patient Name: Nataliia Duenas  MRN: [id number]  Today's Date: 6/1/2022    Admit Date: 5/31/2022     Discharge Needs Assessment     Row Name 06/01/22 1546       Living Environment    People in Home spouse    Name(s) of People in Home Eros (spouse)    Current Living Arrangements home    Primary Care Provided by self    Provides Primary Care For no one    Family Caregiver if Needed spouse    Quality of Family Relationships helpful;involved;supportive    Able to Return to Prior Arrangements yes    Living Arrangement Comments Lives w/spouse       Resource/Environmental Concerns    Resource/Environmental Concerns none    Transportation Concerns none       Transition Planning    Patient/Family Anticipates Transition to home with family    Patient/Family Anticipated Services at Transition none    Transportation Anticipated family or friend will provide       Discharge Needs Assessment    Readmission Within the Last 30 Days no previous admission in last 30 days    Equipment Currently Used at Home none    Concerns to be Addressed discharge planning    Anticipated Changes Related to Illness none    Equipment Needed After Discharge none    Provided Post Acute Provider List? N/A    Provided Post Acute Provider Quality & Resource List? N/A               Discharge Plan     Row Name 06/01/22 1547       Plan    Plan Anticipates DC to home. Lives w/spouse.    Patient/Family in Agreement with Plan yes    Plan Comments CM attempted to visit with patient at 11:40 am; however, she was off the floor. CM called patient's room at 3:48 pm and talked with the spouse (patient unavailable), who assisted with completion of initial assessment. Patient lives w/spouse, who will transport home at discharge. No DME. IADL. Patient drives, but does not work. Home pharmacy is [facility], but patient agreeable to Meds to Bed. CM updated pharmacy. Patient's PCP is TARUN Alvarenga (GIDEON updated in epic).               Continued Care and Services - Admitted Since 5/31/2022    Coordination has not been started for this encounter.       Expected Discharge Date and Time     Expected Discharge Date Expected Discharge Time    Terell 3, 2022          Demographic Summary     Row Name 06/01/22 1545       General Information    Admission Type observation    Arrived From emergency department    Referral Source emergency department    Reason for Consult discharge planning    Preferred Language English       Contact Information    Permission Granted to Share Info With                Functional Status     Row Name 06/01/22 1546       Functional Status    Usual Activity Tolerance good    Current Activity Tolerance good       Functional Status, IADL    Medications independent    Meal Preparation independent    Housekeeping independent    Laundry independent    Shopping independent       Mental Status    General Appearance WDL WDL       Mental Status Summary    Recent Changes in Mental Status/Cognitive Functioning no changes       Employment/    Employment Status unemployed              Phone communication or documentation only - no physical contact with patient or family.    Jenny Song, RN, MSN  Care Manager  [phone]

## 2022-06-01 NOTE — CONSULTS
Primary Care Provider: Provider, No Known     Consult requested by:  Dale Avila MD    Reason for Consultation: Neurological evaluation for slurred speech.     Nataliia Duenas is a 52 y.o. female *    History taken from: patient chart RN    Chief complaint: slurred speech.        SUBJECTIVE:    History of present illness:   The patient is a 52 year old lady with history of 80 pack year of smoking and history of hypertension who was in usual health about 2 days ago who had an episode of slurred speech.  She did not have any difficulty in comprehension.  The patient also had tremors at that time.  She was unable to hold objects.  She also had a fever of 101.  She did not have an similar episodes in the past.  The patient did not have any vision problems, focal weakness, bowel and bladder problems.      Review of Systems   Constitutional: fever.   HENT: Negative.    Eyes: Negative.    Respiratory: Negative.    Cardiovascular: Negative.    Gastrointestinal: Negative.    Genitourinary: Negative.    Musculoskeletal: Negative  Skin: Negative.    Neurological: slurred speech..    Hematological: Negative.    Psychiatric/Behavioral: Negative.        PATIENT HISTORY:  No past medical history on file., No past surgical history on file., No family history on file.,  ,   Medications Prior to Admission   Medication Sig Dispense Refill Last Dose   • escitalopram (Lexapro) 20 MG tablet Take 20 mg by mouth Every Night.      • amitriptyline (ELAVIL) 150 MG tablet Take 150 mg by mouth Every Night.      • dicyclomine (BENTYL) 10 MG capsule Take 10 mg by mouth 4 (Four) Times a Day.      • DULoxetine (CYMBALTA) 30 MG capsule Take 30 mg by mouth 3 (Three) Times a Day.      • estradiol (ESTRACE) 1 MG tablet Take 1 mg by mouth Every Morning.      • gabapentin (NEURONTIN) 600 MG tablet Take 600 mg by mouth 3 (Three) Times a Day.      • hydrOXYzine pamoate (VISTARIL) 100 MG capsule Take 100 mg by mouth Every Night.      • Naloxegol  Oxalate (Movantik) 25 MG tablet Take 25 mg by mouth Every Morning.      • oxyCODONE-acetaminophen (PERCOCET)  MG per tablet Take 1 tablet by mouth Every 4 (Four) Hours As Needed.      • promethazine (PHENERGAN) 25 MG tablet Take 25 mg by mouth 3 (Three) Times a Day As Needed.      • rosuvastatin (CRESTOR) 10 MG tablet Take 10 mg by mouth Daily.      • sucralfate (CARAFATE) 1 g tablet Take 1 g by mouth 4 (Four) Times a Day.      • SUMAtriptan (IMITREX) 100 MG tablet Take 100 mg by mouth 2 (Two) Times a Day As Needed.      • TiZANidine (ZANAFLEX) 4 MG capsule Take 4 mg by mouth Every 8 (Eight) Hours.      • topiramate (TOPAMAX) 50 MG tablet Take 50 mg by mouth Daily.      , Scheduled Meds:  enoxaparin, 40 mg, Subcutaneous, Q24H  famotidine, 40 mg, Oral, Daily  senna-docusate sodium, 2 tablet, Oral, BID  sodium chloride, 10 mL, Intravenous, Q12H    , Continuous Infusions:   , PRN Meds:  •  senna-docusate sodium **AND** polyethylene glycol **AND** bisacodyl **AND** bisacodyl  •  HYDROcodone-acetaminophen  •  melatonin  •  nitroglycerin  •  ondansetron  •  [COMPLETED] Insert peripheral IV **AND** sodium chloride  •  sodium chloride, Allergies:  Patient has no known allergies.    ________________________________________________________        OBJECTIVE:  Upon today's exam, The patient is laying in bed in no apparent distress.  Head NC, AT, Neck supple, trachea midline.  Lungs CTA,  Good pulmonary effort. CV S1-S2 no murmur.  Abdomen soft, non tender.  Ext no edema, no cyanosis.          Neurologic Exam  The patient is awake, alert, oriented to person, place and time.  Name common objects, follow commands.  Speech fluent with good comprehension.  CN VFFC, EOMI, no facial droop. Tongue midline.  Motor 5/5.  Sensory light touch intact.  Reflexes absent, plantar mute.  Cerebellum finger to nose intact.    ________________________________________________________   RESULTS REVIEW:    VITAL SIGNS:   Temp:  [98.1 °F (36.7  °C)-98.9 °F (37.2 °C)] 98.9 °F (37.2 °C)  Heart Rate:  [69-92] 82  Resp:  [16-22] 20  BP: (103-165)/(64-76) 129/66     LABS:  WBC   Date Value Ref Range Status   05/31/2022 14.10 (H) 3.40 - 10.80 10*3/mm3 Final     RBC   Date Value Ref Range Status   05/31/2022 4.13 3.77 - 5.28 10*6/mm3 Final     Hemoglobin   Date Value Ref Range Status   05/31/2022 12.1 12.0 - 15.9 g/dL Final     Hematocrit   Date Value Ref Range Status   05/31/2022 37.0 34.0 - 46.6 % Final     MCV   Date Value Ref Range Status   05/31/2022 89.6 79.0 - 97.0 fL Final     MCH   Date Value Ref Range Status   05/31/2022 29.2 26.6 - 33.0 pg Final     MCHC   Date Value Ref Range Status   05/31/2022 32.6 31.5 - 35.7 g/dL Final     RDW   Date Value Ref Range Status   05/31/2022 13.9 12.3 - 15.4 % Final     RDW-SD   Date Value Ref Range Status   05/31/2022 43.8 37.0 - 54.0 fl Final     MPV   Date Value Ref Range Status   05/31/2022 8.5 6.0 - 12.0 fL Final     Platelets   Date Value Ref Range Status   05/31/2022 193 140 - 450 10*3/mm3 Final     Neutrophil %   Date Value Ref Range Status   05/31/2022 74.3 42.7 - 76.0 % Final     Lymphocyte %   Date Value Ref Range Status   05/31/2022 17.3 (L) 19.6 - 45.3 % Final     Monocyte %   Date Value Ref Range Status   05/31/2022 5.3 5.0 - 12.0 % Final     Eosinophil %   Date Value Ref Range Status   05/31/2022 2.8 0.3 - 6.2 % Final     Basophil %   Date Value Ref Range Status   05/31/2022 0.3 0.0 - 1.5 % Final     Neutrophils, Absolute   Date Value Ref Range Status   05/31/2022 10.40 (H) 1.70 - 7.00 10*3/mm3 Final     Lymphocytes, Absolute   Date Value Ref Range Status   05/31/2022 2.40 0.70 - 3.10 10*3/mm3 Final     Monocytes, Absolute   Date Value Ref Range Status   05/31/2022 0.70 0.10 - 0.90 10*3/mm3 Final     Eosinophils, Absolute   Date Value Ref Range Status   05/31/2022 0.40 0.00 - 0.40 10*3/mm3 Final     Basophils, Absolute   Date Value Ref Range Status   05/31/2022 0.00 0.00 - 0.20 10*3/mm3 Final     nRBC    Date Value Ref Range Status   05/31/2022 0.2 0.0 - 0.2 /100 WBC Final     Glucose   Date Value Ref Range Status   05/31/2022 109 (H) 65 - 99 mg/dL Final     BUN   Date Value Ref Range Status   05/31/2022 8 6 - 20 mg/dL Final     Creatinine   Date Value Ref Range Status   05/31/2022 1.11 (H) 0.57 - 1.00 mg/dL Final     Sodium   Date Value Ref Range Status   05/31/2022 139 136 - 145 mmol/L Final     Potassium   Date Value Ref Range Status   05/31/2022 3.4 (L) 3.5 - 5.2 mmol/L Final     Chloride   Date Value Ref Range Status   05/31/2022 105 98 - 107 mmol/L Final     CO2   Date Value Ref Range Status   05/31/2022 23.0 22.0 - 29.0 mmol/L Final     Calcium   Date Value Ref Range Status   05/31/2022 9.0 8.6 - 10.5 mg/dL Final     Total Protein   Date Value Ref Range Status   05/31/2022 6.6 6.0 - 8.5 g/dL Final     Albumin   Date Value Ref Range Status   05/31/2022 3.70 3.50 - 5.20 g/dL Final     ALT (SGPT)   Date Value Ref Range Status   05/31/2022 33 1 - 33 U/L Final     AST (SGOT)   Date Value Ref Range Status   05/31/2022 25 1 - 32 U/L Final     Alkaline Phosphatase   Date Value Ref Range Status   05/31/2022 108 39 - 117 U/L Final     Total Bilirubin   Date Value Ref Range Status   05/31/2022 0.4 0.0 - 1.2 mg/dL Final     BUN/Creatinine Ratio   Date Value Ref Range Status   05/31/2022 7.2 7.0 - 25.0 Final     Anion Gap   Date Value Ref Range Status   05/31/2022 11.0 5.0 - 15.0 mmol/L Final       Lab Results   Component Value Date    TSH 1.400 05/31/2022     (H) 05/31/2022         IMAGING STUDIES:  CT Head Without Contrast    Result Date: 5/31/2022  Normal CT of the brain without contrast.  Electronically Signed By-Gwen Woodson MD On:5/31/2022 3:15 PM This report was finalized on [id number] by  Gwen Woodson MD.    XR Chest 1 View    Result Date: 5/31/2022  Mild cardiomegaly, which is new since the 2017. There are no overt changes of pulmonary edema. Linear densities in the lung bases favored to  represent subsegmental atelectasis.  Electronically Signed By-Gwen Woodson MD On:5/31/2022 12:56 PM This report was finalized on [id number] by  Gwen Woodson MD.      I reviewed the patient's new clinical results.      ________________________________________________________     PROBLEM LIST:    Weakness    GERD without esophagitis    Tobacco abuse    Slurred speech    Acute UTI (urinary tract infection)          Assessment & Plan   ASSESSMENT/PLAN:  The patient is a 52 year old lady with multiple medical problems including GERD, acute UTI, history of tobacco abuse who had an episode of slurred speech and some weakness.  Her symptoms have resolved.  The clinical features are suggestive of TIA.   Rec:  Will obtain MRI of Brain without contrast.  CTA of Head and Neck.  Blood work up for Vitamin B12.  Will continue ASA.  Will follow.   GERD, UTI , tobacco abuse as per hospitalist, MD.   Modification of stroke risk factors:   - Blood pressure should be less than 130/80 outpatient, HbA1c less than 6.5, LDL less than 70; b12>500 and smoking cessation if applicable. We would be grateful if the primary team / primary care physician would keep a close watch on the above targets.  - Stroke education  - Follow up with neurologist of choice      I discussed the patient's findings and my recommendations with patient and nursing staff    Osman Valente MD  06/01/22  09:57 EDT

## 2022-06-01 NOTE — DISCHARGE INSTRUCTIONS
"Dr. Valente, Neurologist Stroke states, you \"had an episode of slurred speech and some weakness.  Her symptoms have resolved.  The clinical features are suggestive of TIA.\"  This is a transient ischemic attack.  Stroke packet brought into the room, personal risk factors for stroke identified and discussed with the patient include:     Former smoker 41 years 2 to 2.5 packs a day  Elevated blood pressure  Elevated total cholesterol 248, normal 200 or less  Elevated LDL or \"bad\" cholesterol 168, normla is 100 or less  Elevated body mass index is 36, normal is 18-25.     "

## 2022-06-01 NOTE — DISCHARGE SUMMARY
[facility]  DISCHARGE SUMMARY    Patient Name: Nataliia Duenas  : 1970  MRN: [id number]    Date of Admission: 2022  Discharge Diagnosis: Weakness  Date of Discharge:  22  Primary Care Physician: Marcia Alvarenga MD    Presenting Problem:   Slurred speech [R47.81]  Weakness [R53.1]  Urinary tract infection without hematuria, site unspecified [N39.0]    Active and Resolved Hospital Problems:  Active Hospital Problems    Diagnosis POA   • **Weakness [R53.1] Yes   • GERD without esophagitis [K21.9] Unknown   • Tobacco abuse [Z72.0] Unknown   • Slurred speech [R47.81] Yes   • Acute UTI (urinary tract infection) [N39.0] Yes      Resolved Hospital Problems   No resolved problems to display.   Possible TIA  neurology consulted  Neuro-check  Pt/ot  Continue aspirin and Crestor  IV rocephin  GI/DVT prophylaxis     UTI Rocephin continue Omnicef  Hospital Course     Hospital Course:  History of Present Illness: Nataliia Duenas is a 52 y.o. female who presented to [facility] on 2022 complaining of weakness, dizziness, symptoms started  with slurred speech,  Dizziness and difficulty ambulating, she started having tremors in her hands and dropping things.  And had a fever up to 101.  She denied any paralysis.  She denied any severe headache.  She denied any facial droop no chest pain neck arm or jaw pain.  She denies any vomiting or diarrhea.  No injury.  Nothing really makes this better or worse ongoing continuous since yesterday about 24 hours moderate to severe.  Generalized weakness and body aches.  No tick bites or rashes no dysuria or frequency.  No cough congestion out of ordinary.  States she recently quit smoking.  No rashes.  No abdominal pain.  Location is diffuse moderate severe ongoing continuous 24 hours she has been at home and associated with the above     CT Head Without Contrast  Normal CT of the brain without  contrast.  Electronically Signed By-Gwen Woodson MD On:5/31/2022 3:15 PM This report was finalized on [id number] by  Gwen Woodson MD.     XR Chest 1 View  Mild cardiomegaly, which is new since the 2017. There are no overt changes of pulmonary edema. Linear densities in the lung bases favored to represent subsegmental atelectasis.      6/1/22 doing better today, will dc home, continue meds per MAR, condition on dc stable.    DISCHARGE Follow Up Recommendations for labs and diagnostics: Follow-up with PCP in a week follow-up with neurology neck schedule appointment      Reasons For Change In Medications and Indications for New Medications:  Aspirin  Mag ox  kcl  Day of Discharge     Vital Signs:  Temp:  [98.1 °F (36.7 °C)-99.1 °F (37.3 °C)] 98.1 °F (36.7 °C)  Heart Rate:  [76-92] 86  Resp:  [15-23] 23  BP: (129-168)/(66-94) 142/76    Physical Exam   HENT:      Head: Normocephalic and atraumatic.      Nose: Nose normal. No congestion or rhinorrhea.      Mouth/Throat:      Mouth: Mucous membranes are moist.      Pharynx: No oropharyngeal exudate.   Eyes:      General: No scleral icterus.        Right eye: No discharge.         Left eye: No discharge.      Extraocular Movements: Extraocular movements intact.      Conjunctiva/sclera: Conjunctivae normal.      Pupils: Pupils are equal, round, and reactive to light.   Neck:      Thyroid: No thyromegaly.      Vascular: No carotid bruit or JVD.      Trachea: No tracheal deviation.   Cardiovascular:      Rate and Rhythm: Normal rate and regular rhythm.      Pulses: Normal pulses.      Heart sounds: Normal heart sounds. No murmur heard.    No friction rub. No gallop.   Pulmonary:      Effort: Pulmonary effort is normal. No respiratory distress.      Breath sounds: Normal breath sounds. No stridor. No wheezing, rhonchi or rales.   Chest:      Chest wall: No tenderness.   Abdominal:      General: Bowel sounds are normal. There is no distension.      Palpations: Abdomen is  soft. There is no mass.      Tenderness: There is no abdominal tenderness. There is no guarding or rebound.      Hernia: No hernia is present.   Musculoskeletal:         General: Tenderness present. No swelling or deformity. Normal range of motion.      Cervical back: Normal range of motion and neck supple. No rigidity. No muscular tenderness.      Right lower leg: No edema.   Lymphadenopathy:      Cervical: No cervical adenopathy.   Skin:     General: Skin is warm and dry.      Coloration: Skin is not jaundiced or pale.      Findings: No bruising, erythema or rash.   Neurological:      General: No focal deficit present.      Mental Status: She is alert and oriented to person, place, and time. Mental status is at baseline.      Cranial Nerves: No cranial nerve deficit.      Sensory: No sensory deficit.      Motor: No weakness or abnormal muscle tone.      Coordination: Coordination normal.   Psychiatric:         Mood and Affect: Mood normal.         Behavior: Behavior normal.         Thought Content: Thought content normal.         Judgment: Judgment normal.           Pertinent  and/or Most Recent Results     LAB RESULTS:      Lab 05/31/22  2256 05/31/22  1250 05/31/22  1245   WBC  --   --  14.10*   HEMOGLOBIN  --   --  12.1   HEMATOCRIT  --   --  37.0   PLATELETS  --   --  193   NEUTROS ABS  --   --  10.40*   LYMPHS ABS  --   --  2.40   MONOS ABS  --   --  0.70   EOS ABS  --   --  0.40   MCV  --   --  89.6   PROCALCITONIN 0.92*  --   --    LACTATE 0.8 0.4*  --          Lab 05/31/22  2256 05/31/22  1245   SODIUM 139 136   POTASSIUM 3.4* 3.6   CHLORIDE 105 101   CO2 23.0 23.0   ANION GAP 11.0 12.0   BUN 8 8   CREATININE 1.11* 0.86   EGFR 59.9* 81.4   GLUCOSE 109* 92   CALCIUM 9.0 8.8   MAGNESIUM 1.8  --    PHOSPHORUS 2.8  --    HEMOGLOBIN A1C 5.8*  --    TSH 1.400  --          Lab 05/31/22  1245   TOTAL PROTEIN 6.6   ALBUMIN 3.70   GLOBULIN 2.9   ALT (SGPT) 33   AST (SGOT) 25   BILIRUBIN 0.4   ALK PHOS 108          Lab 05/31/22 2256 05/31/22  1245   PROBNP 357.5  --    TROPONIN T  --  <0.010         Lab 05/31/22 2256   CHOLESTEROL 248*   LDL CHOL 168*   HDL CHOL 48   TRIGLYCERIDES 175*             Brief Urine Lab Results  (Last result in the past 365 days)      Color   Clarity   Blood   Leuk Est   Nitrite   Protein   CREAT   Urine HCG        05/31/22 1342 Dark Yellow   Cloudy  Comment: Result checked   Negative   Large (3+)   Positive   Trace               Microbiology Results (last 10 days)     Procedure Component Value - Date/Time    Urine Culture - Urine, Urine, Clean Catch [[id number]]  (Abnormal) Collected: 05/31/22 1342    Lab Status: Preliminary result Specimen: Urine, Clean Catch Updated: 06/01/22 1331     Urine Culture >100,000 CFU/mL Escherichia coli    Narrative:      Colonization of the urinary tract without infection is common. Treatment is discouraged unless the patient is symptomatic, pregnant, or undergoing an invasive urologic procedure.    Blood Culture - Blood, Arm, Left [[id number]]  (Normal) Collected: 05/31/22 1322    Lab Status: Preliminary result Specimen: Blood from Arm, Left Updated: 06/01/22 1332     Blood Culture No growth at 24 hours    Respiratory Panel PCR w/COVID-19(SARS-CoV-2) BETO/AVIVA/CHANDNI/PAD/COR/MAD/JACQUES In-House, NP Swab in UTM/VTM, 3-4 HR TAT - Swab, Nasopharynx [[id number]]  (Normal) Collected: 05/31/22 1246    Lab Status: Final result Specimen: Swab from Nasopharynx Updated: 05/31/22 1340     ADENOVIRUS, PCR Not Detected     Coronavirus 229E Not Detected     Coronavirus HKU1 Not Detected     Coronavirus NL63 Not Detected     Coronavirus OC43 Not Detected     COVID19 Not Detected     Human Metapneumovirus Not Detected     Human Rhinovirus/Enterovirus Not Detected     Influenza A PCR Not Detected     Influenza B PCR Not Detected     Parainfluenza Virus 1 Not Detected     Parainfluenza Virus 2 Not Detected     Parainfluenza Virus 3 Not Detected     Parainfluenza Virus 4 Not Detected     RSV,  PCR Not Detected     Bordetella pertussis pcr Not Detected     Bordetella parapertussis PCR Not Detected     Chlamydophila pneumoniae PCR Not Detected     Mycoplasma pneumo by PCR Not Detected    Narrative:      In the setting of a positive respiratory panel with a viral infection PLUS a negative procalcitonin without other underlying concern for bacterial infection, consider observing off antibiotics or discontinuation of antibiotics and continue supportive care. If the respiratory panel is positive for atypical bacterial infection (Bordetella pertussis, Chlamydophila pneumoniae, or Mycoplasma pneumoniae), consider antibiotic de-escalation to target atypical bacterial infection.    Blood Culture - Blood, Arm, Right [[id number]]  (Normal) Collected: 05/31/22 1245    Lab Status: Preliminary result Specimen: Blood from Arm, Right Updated: 06/01/22 1304     Blood Culture No growth at 24 hours          CT Head Without Contrast    Result Date: 5/31/2022  Impression: Normal CT of the brain without contrast.  Electronically Signed By-Gwen Woodson MD On:5/31/2022 3:15 PM This report was finalized on [id number] by  Gwen Woodson MD.    MRI Brain Without Contrast    Result Date: 6/1/2022  Impression:  Normal brain MRI examination.   Electronically Signed By-Jd Crespo MD On:6/1/2022 3:15 PM This report was finalized on [id number] by  Jd Crespo MD.    XR Chest 1 View    Result Date: 5/31/2022  Impression: Mild cardiomegaly, which is new since the 2017. There are no overt changes of pulmonary edema. Linear densities in the lung bases favored to represent subsegmental atelectasis.  Electronically Signed By-Gwen Woodson MD On:5/31/2022 12:56 PM This report was finalized on [id number] by  Gwen Woodson MD.    CT Angiogram Carotids    Result Date: 6/1/2022  Impression: No intra or extracranial stenoses, aneurysms, or occlusions.  Electronically Signed By-Toy Sheikh MD On:6/1/2022 4:24 PM This report was  finalized on [id number] by  Toy Sheikh MD.    CT Angiogram Head    Result Date: 6/1/2022  Impression: No intra or extracranial stenoses, aneurysms, or occlusions.  Electronically Signed By-Toy Sheikh MD On:6/1/2022 4:24 PM This report was finalized on [id number] by  Toy Sheikh MD.            Labs Pending at Discharge:  Pending Labs     Order Current Status    Blood Culture - Blood, Arm, Left Preliminary result    Blood Culture - Blood, Arm, Right Preliminary result    Urine Culture - Urine, Urine, Clean Catch Preliminary result        Procedures Performed         Consults:   Consults     Date and Time Order Name Status Description    5/31/2022 10:31 PM Inpatient Neurology Consult General Completed     5/31/2022  3:42 PM Inpatient Neurology Consult Other (see comments) (Slurring words)      5/31/2022  3:42 PM Hospitalist (on-call MD unless specified)          ASSESSMENT/PLAN:  The patient is a 52 year old lady with multiple medical problems including GERD, acute UTI, history of tobacco abuse who had an episode of slurred speech and some weakness.  Her symptoms have resolved.  The clinical features are suggestive of TIA.   Rec:  Will obtain MRI of Brain without contrast.  CTA of Head and Neck.  Blood work up for Vitamin B12.  Will continue ASA.  Will follow.   GERD, UTI , tobacco abuse as per hospitalMD osiel.   Modification of stroke risk factors:   - Blood pressure should be less than 130/80 outpatient, HbA1c less than 6.5, LDL less than 70; b12>500 and smoking cessation if applicable. We would be grateful if the primary team / primary care physician would keep a close watch on the above targets.  - Stroke education  - Follow up with neurologist of choice        I discussed the patient's findings and my recommendations with patient and nursing staff     Osman Valente MD    Discharge Details        Discharge Medications      New Medications      Instructions Start Date   aspirin  MG tablet    325 mg, Oral, Daily      cefdinir 300 MG capsule  Commonly known as: OMNICEF   300 mg, Oral, 2 Times Daily      magnesium oxide 400 MG tablet  Commonly known as: MAG-OX   400 mg, Oral, Daily      potassium chloride 10 MEQ CR tablet   10 mEq, Oral, 2 Times Daily         Continue These Medications      Instructions Start Date   amitriptyline 150 MG tablet  Commonly known as: ELAVIL   300 mg, Oral, Nightly      dicyclomine 10 MG capsule  Commonly known as: BENTYL   10 mg, Oral, 4 Times Daily      DULoxetine 30 MG capsule  Commonly known as: CYMBALTA   90 mg, Oral, 3 Times Daily      escitalopram 20 MG tablet  Commonly known as: LEXAPRO   20 mg, Oral, Nightly      estradiol 1 MG tablet  Commonly known as: ESTRACE   1 mg, Oral, Every Morning      gabapentin 600 MG tablet  Commonly known as: NEURONTIN   600 mg, Oral, 3 Times Daily      Movantik 25 MG tablet  Generic drug: Naloxegol Oxalate   25 mg, Oral, Every Morning      OLANZapine 10 MG tablet  Commonly known as: zyPREXA   20 mg, Oral, Nightly      omeprazole 40 MG capsule  Commonly known as: priLOSEC   40 mg, Oral, 2 Times Daily      oxyCODONE-acetaminophen  MG per tablet  Commonly known as: PERCOCET   1 tablet, Oral, Every 4 Hours PRN      promethazine 25 MG tablet  Commonly known as: PHENERGAN   25 mg, Oral, 3 Times Daily PRN      rosuvastatin 10 MG tablet  Commonly known as: CRESTOR   10 mg, Oral, Daily      sucralfate 1 g tablet  Commonly known as: CARAFATE   1 g, Oral, 4 Times Daily      SUMAtriptan 100 MG tablet  Commonly known as: IMITREX   100 mg, Oral, 2 Times Daily PRN      TiZANidine 4 MG capsule  Commonly known as: ZANAFLEX   4 mg, Oral, Every 8 Hours      topiramate 50 MG tablet  Commonly known as: TOPAMAX   50 mg, Oral, Daily         Stop These Medications    hydrOXYzine pamoate 100 MG capsule  Commonly known as: VISTARIL     ibuprofen 800 MG tablet  Commonly known as: ADVIL,MOTRIN     risperiDONE 1 MG tablet  Commonly known as: risperDAL           No Known Allergies    Discharge Disposition:   Home or Self Care    Diet:  Hospital:  Diet Order   Procedures   • Diet Regular     Discharge Activity:   Activity Instructions     Gradually Increase Activity Until at Pre-Hospitalization Level          CODE STATUS:  Code Status and Medical Interventions:   Ordered at: 06/01/22 1809     Level Of Support Discussed With:    Patient     Code Status (Patient has no pulse and is not breathing):    CPR (Attempt to Resuscitate)     Medical Interventions (Patient has pulse or is breathing):    Full Support     No future appointments.    Additional Instructions for the Follow-ups that You Need to Schedule     Discharge Follow-up with PCP   As directed       Currently Documented PCP:    Marcia Alvarenga MD    PCP Phone Number:    [phone]     Follow Up Details: 1 week         Discharge Follow-up with Specified Provider: neurology; 2 Weeks   As directed      To: neurology    Follow Up: 2 Weeks             Time spent on Discharge including face to face service:  34 minutes    This patient has been examined wearing appropriate Personal Protective Equipment and discussed with rn. 06/01/22      Signature: Electronically signed by Yung Bates MD, 06/01/22, 6:22 PM EDT.

## 2022-06-01 NOTE — THERAPY EVALUATION
Patient Name: Nataliia Duenas  : 1970    MRN: [id number]                              Today's Date: 2022       Admit Date: 2022    Visit Dx:     ICD-10-CM ICD-9-CM   1. Weakness  R53.1 780.79   2. Slurred speech  R47.81 784.59   3. Urinary tract infection without hematuria, site unspecified  N39.0 599.0     Patient Active Problem List   Diagnosis   • Weakness   • GERD without esophagitis   • Tobacco abuse   • Slurred speech   • Acute UTI (urinary tract infection)     No past medical history on file.  No past surgical history on file.   General Information     Row Name 22 1129          Physical Therapy Time and Intention    Document Type evaluation  -EL     Mode of Treatment individual therapy;physical therapy  -     Row Name 22 1129          General Information    Patient Profile Reviewed yes  -EL     Prior Level of Function independent:;all household mobility;ADL's  -     Row Name 22 1129          Living Environment    People in Home spouse  -     Row Name 22 1129          Home Main Entrance    Number of Stairs, Main Entrance two  -     Row Name 22 1129          Stairs Within Home, Primary    Number of Stairs, Within Home, Primary twelve;other (see comments)  basement, but doesn't need to go down there  -     Row Name 22 1129          Cognition    Orientation Status (Cognition) oriented x 4  -     Row Name 22 1129          Safety Issues, Functional Mobility    Impairments Affecting Function (Mobility) balance  -EL           User Key  (r) = Recorded By, (t) = Taken By, (c) = Cosigned By    Initials Name Provider Type    EL Wellington Larry PT Physical Therapist               Mobility     Row Name 22 1129          Bed Mobility    Bed Mobility bed mobility (all) activities  -EL     All Activities, [place] (Bed Mobility) independent  -EL     Row Name 22 1129          Bed-Chair Transfer    Bed-Chair [place] (Transfers)  supervision  -EL     [facility] Name 06/01/22 1129          Sit-Stand Transfer    Sit-Stand Joice (Transfers) standby assist  -EL     [facility] Name 06/01/22 1129          Gait/Stairs (Locomotion)    [place] Level (Gait) standby assist  -EL     Distance in Feet (Gait) 220  -EL     Deviations/Abnormal Patterns (Gait) gait speed decreased  -EL     Comment, (Gait/Stairs) mild lateral sway but no significant LOB  -EL           User Key  (r) = Recorded By, (t) = Taken By, (c) = Cosigned By    Initials Name Provider Type    Wellington Encarnacion PT Physical Therapist               Obj/Interventions     Row Name 06/01/22 1131          Range of Motion Comprehensive    General Range of Motion bilateral lower extremity ROM WFL  -EL     Row Name 06/01/22 1131          Strength Comprehensive (MMT)    General Manual Muscle Testing (MMT) Assessment lower extremity strength deficits identified  -EL     Comment, General Manual Muscle Testing (MMT) Assessment BLE 4-/5 gross  -EL     [facility] Name 06/01/22 1131          Balance    Balance Assessment sitting static balance;standing static balance;standing dynamic balance  -EL     Static Sitting Balance independent  -EL     Static Standing Balance standby assist  -EL     Dynamic Standing Balance standby assist  -EL           User Key  (r) = Recorded By, (t) = Taken By, (c) = Cosigned By    Initials Name Provider Type    Wellington Encarnacion PT Physical Therapist               Goals/Plan    No documentation.                Clinical Impression     [facility] Name 06/01/22 1132          Pain    Additional Documentation Pain Scale: FACES Pre/Post-Treatment (Group)  -EL     Row Name 06/01/22 1132          Pain Scale: FACES Pre/Post-Treatment    Pain: FACES Scale, Pretreatment 2-->hurts little bit  -EL     Posttreatment Pain Rating 2-->hurts little bit  -EL     Pain Location generalized  -[facility] Name 06/01/22 1132          Plan of Care Review    Plan of Care Reviewed With patient  -EL     Outcome Evaluation Pt is  a 53 YO F admitted with slurred speech. Pt states she lives at home with spouse, typically is independent with all ADLS, ambualtion without AD states she is 'clusmy' and has had a fall within the last month, attributes it to tripping, no instances of passing out or legs giving out etc. This date pt demonstrates good mobility ambulating without physical assistance. Occasionally pt would use UE assistance on wall to stabilize, but no significant LOB, just mild lateral sway. Pt states her gait is near her baseline, recommendation is return home with family assistance. PT to sign off at this time, will require new orders if pt status changes.  -     Row Name 06/01/22 1132          Therapy Assessment/Plan (PT)    Criteria for Skilled Interventions Met (PT) no problems identified which require skilled intervention  -EL     Predicted Duration of Therapy Intervention (PT) Until d/c.  -[facility] Name 06/01/22 1132          Vital Signs    Pre SpO2 (%) 91  -EL     O2 Delivery Pre Treatment room air  -EL     Intra SpO2 (%) 90  -EL     O2 Delivery Intra Treatment room air  -EL     Post SpO2 (%) 91  -EL     O2 Delivery Post Treatment room air  -EL     Pre Patient Position Supine  -EL     Intra Patient Position Standing  -EL     Post Patient Position Sitting  -EL     [facility] Name 06/01/22 1132          Positioning and Restraints    Pre-Treatment Position in bed  -EL     Post Treatment Position chair  -EL     In Chair notified nsg;reclined;call light within reach;encouraged to call for assist  pt has been up ad urslua in room  -EL           User Key  (r) = Recorded By, (t) = Taken By, (c) = Cosigned By    Initials Name Provider Type    Wellington Encarnacion, PT Physical Therapist               Outcome Measures     Row Name 06/01/22 1141          Modified Jami Scale    Modified Jami Scale 1 - No significant disability despite symptoms.  Able to carry out all usual duties and activities.  -[facility] Name 06/01/22 1141          Functional  Assessment    Outcome Measure Options Modified [place]  -           User Key  (r) = Recorded By, (t) = Taken By, (c) = Cosigned By    Initials Name Provider Type    Wellington Encarnacion PT Physical Therapist                             Physical Therapy Education                 Title: PT OT SLP Therapies (Done)     Topic: Physical Therapy (Done)     Point: Mobility training (Done)     Learning Progress Summary           Patient Acceptance, E,TB, VU by  at 6/1/2022 1141                   Point: Precautions (Done)     Learning Progress Summary           Patient Acceptance, E,TB, VU by  at 6/1/2022 1141                               User Key     Initials Effective Dates Name Provider Type [facility] 06/23/20 -  Wellington Larry PT Physical Therapist PT              PT Recommendation and Plan     Plan of Care Reviewed With: patient  Outcome Evaluation: Pt is a 51 YO F admitted with slurred speech. Pt states she lives at home with spouse, typically is independent with all ADLS, ambualtion without AD states she is 'clusmy' and has had a fall within the last month, attributes it to tripping, no instances of passing out or legs giving out etc. This date pt demonstrates good mobility ambulating without physical assistance. Occasionally pt would use UE assistance on wall to stabilize, but no significant LOB, just mild lateral sway. Pt states her gait is near her baseline, recommendation is return home with family assistance. PT to sign off at this time, will require new orders if pt status changes.     Time Calculation:    PT Charges     Row Name 06/01/22 1142             Time Calculation    Start Time 0853  -EL      Stop Time 0912  -EL      Time Calculation (min) 19 min  -EL      PT Received On 06/01/22  -            User Key  (r) = Recorded By, (t) = Taken By, (c) = Cosigned By    Initials Name Provider Type    Wellington Encarnacion PT Physical Therapist              Therapy Charges for Today     Code Description Service Date  Service Provider Modifiers Qty    71165728572 HC PT EVAL MOD COMPLEXITY 3 6/1/2022 Wellington Larry, PT GP 1          PT G-Codes  Outcome Measure Options: Modified Jami  Modified [place] Scale: 1 - No significant disability despite symptoms.  Able to carry out all usual duties and activities.    Wellington Larry, PT  6/1/2022

## 2022-06-01 NOTE — PLAN OF CARE
Goal Outcome Evaluation:  Plan of Care Reviewed With: patient           Outcome Evaluation: Pt is a 51 YO F admitted with slurred speech. Pt states she lives at home with spouse, typically is independent with all ADLS, ambualtion without AD states she is 'clusmy' and has had a fall within the last month, attributes it to tripping, no instances of passing out or legs giving out etc. This date pt demonstrates good mobility ambulating without physical assistance. Occasionally pt would use UE assistance on wall to stabilize, but no significant LOB, just mild lateral sway. Pt states her gait is near her baseline, recommendation is return home with family assistance. PT to sign off at this time, will require new orders if pt status changes.   Gout     Gout is painful swelling of your joints. Gout is a type of arthritis. It is caused by having too much uric acid in your body. Uric acid is a chemical that is made when your body breaks down substances called purines. If your body has too much uric acid, sharp crystals can form and build up in your joints. This causes pain and swelling.  Gout attacks can happen quickly and be very painful (acute gout). Over time, the attacks can affect more joints and happen more often (chronic gout).  What are the causes?  Too much uric acid in your blood. This can happen because:  Your kidneys do not remove enough uric acid from your blood.Your body makes too much uric acid.You eat too many foods that are high in purines. These foods include organ meats, some seafood, and beer.Trauma or stress.What increases the risk?  Having a family history of gout.Being male and middle-aged.Being female and having gone through menopause.Being very overweight (obese).Drinking alcohol, especially beer.Not having enough water in the body (being dehydrated).Losing weight too quickly.Having an organ transplant.Having lead poisoning.Taking certain medicines.Having kidney disease.Having a skin condition called psoriasis.What are the signs or symptoms?  An attack of acute gout usually happens in just one joint. The most common place is the big toe. Attacks often start at night. Other joints that may be affected include joints of the feet, ankle, knee, fingers, wrist, or elbow. Symptoms of an attack may include:  Very bad pain.Warmth.Swelling.Stiffness.Shiny, red, or purple skin.Tenderness. The affected joint may be very painful to touch.Chills and fever.Chronic gout may cause symptoms more often. More joints may be involved. You may also have white or yellow lumps (tophi) on your hands or feet or in other areas near your joints.  How is this treated?  Treatment for this condition has two phases: treating an acute attack and preventing future attacks.Acute gout treatment may include:  NSAIDs.Steroids. These are taken by mouth or injected into a joint.Colchicine. This medicine relieves pain and swelling. It can be given by mouth or through an IV tube.Preventive treatment may include:  Taking small doses of NSAIDs or colchicine daily.Using a medicine that reduces uric acid levels in your blood.Making changes to your diet. You may need to see a food expert (dietitian) about what to eat and drink to prevent gout.Follow these instructions at home:  During a gout attack        If told, put ice on the painful area:  Put ice in a plastic bag.Place a towel between your skin and the bag.Leave the ice on for 20 minutes, 2–3 times a day.Raise (elevate) the painful joint above the level of your heart as often as you can.Rest the joint as much as possible. If the joint is in your leg, you may be given crutches.Follow instructions from your doctor about what you cannot eat or drink.Avoiding future gout attacks     Eat a low-purine diet. Avoid foods and drinks such as:  Liver.Kidney.Anchovies.Asparagus.Herring.Mushrooms.Mussels.Beer.Stay at a healthy weight. If you want to lose weight, talk with your doctor. Do not lose weight too fast.Start or continue an exercise plan as told by your doctor.Eating and drinking     Drink enough fluids to keep your pee (urine) pale yellow.If you drink alcohol:  Limit how much you use to:  0–1 drink a day for women.0–2 drinks a day for men.Be aware of how much alcohol is in your drink. In the [place] one drink equals one 12 oz bottle of beer (355 mL), one 5 oz glass of wine (148 mL), or one 1½ oz glass of hard liquor (44 mL).General instructions     Take over-the-counter and prescription medicines only as told by your doctor.Do not drive or use heavy machinery while taking prescription pain medicine.Return to your normal activities as told by your doctor. Ask your doctor what activities are safe for you.Keep all follow-up visits as told by your doctor. This is important.Contact a doctor if:  You have another gout attack.You still have symptoms of a gout attack after 10 days of treatment.You have problems (side effects) because of your medicines.You have chills or a fever.You have burning pain when you pee (urinate).You have pain in your lower back or belly.Get help right away if:  You have very bad pain.Your pain cannot be controlled.You cannot pee.Summary  Gout is painful swelling of the joints.The most common site of pain is the big toe, but it can affect other joints.Medicines and avoiding some foods can help to prevent and treat gout attacks.This information is not intended to replace advice given to you by your health care provider. Make sure you discuss any questions you have with your health care provider.    Please follow up with orthopedic surgery in 1 to 2 weeks.

## 2022-06-01 NOTE — NURSING NOTE
Patient upset and threatening to leave AMA d/t her home medications not being ordered. This RN explained the risks of leaving against medical advice. This RN called the on call hospitalist at [phone] and explained the situation. Leni Breauxsherif DIAZN stated d/t her being a stroke workup, she cannot prescribe many controlled substances or narcotics without her being seen by the neurologist, and offered a lidocaine patch for the patient for pain control. This RN explained this to the patient what the APRN stated and she declined the lidocaine patch. Patient is still insitinig on leaving, but states she has no choice but to stay because her  is not answering the phone. Patient agreeable to stay for the night at this current time, will continue to offer alternative pain control methods and educate as needed. VSS call light within reach.

## 2022-06-01 NOTE — PLAN OF CARE
Problem: Skin Injury Risk Increased  Goal: Skin Health and Integrity  Outcome: Ongoing, Progressing     Problem: Adult Inpatient Plan of Care  Goal: Plan of Care Review  Outcome: Ongoing, Progressing  Goal: Patient-Specific Goal (Individualized)  Outcome: Ongoing, Progressing  Goal: Absence of Hospital-Acquired Illness or Injury  Outcome: Ongoing, Progressing  Intervention: Identify and Manage Fall Risk  Recent Flowsheet Documentation  Taken 6/1/2022 0212 by Terra Andrews RN  Safety Promotion/Fall Prevention:   safety round/check completed   assistive device/personal items within reach   clutter free environment maintained   nonskid shoes/slippers when out of bed   room organization consistent  Taken 5/31/2022 2344 by Terra Andrews RN  Safety Promotion/Fall Prevention:   safety round/check completed   assistive device/personal items within reach   clutter free environment maintained   nonskid shoes/slippers when out of bed   room organization consistent  Taken 5/31/2022 2000 by Terra Andrews RN  Safety Promotion/Fall Prevention:   safety round/check completed   assistive device/personal items within reach   clutter free environment maintained   nonskid shoes/slippers when out of bed   room organization consistent  Intervention: Prevent and Manage VTE (Venous Thromboembolism) Risk  Recent Flowsheet Documentation  Taken 5/31/2022 2344 by Terra Andrews RN  VTE Prevention/Management:   bilateral   sequential compression devices off   patient refused intervention  Taken 5/31/2022 2000 by Terra Andrews RN  VTE Prevention/Management:   bilateral   sequential compression devices off   patient refused intervention  Intervention: Prevent Infection  Recent Flowsheet Documentation  Taken 6/1/2022 0212 by Terra Andrews RN  Infection Prevention: environmental surveillance performed  Taken 5/31/2022 2344 by Terra Andrews RN  Infection Prevention:   environmental surveillance performed   hand hygiene  promoted  Taken 5/31/2022 2000 by Terra Andrews RN  Infection Prevention:   environmental surveillance performed   hand hygiene promoted  Goal: Optimal Comfort and Wellbeing  Outcome: Ongoing, Progressing  Intervention: Monitor Pain and Promote Comfort  Recent Flowsheet Documentation  Taken 5/31/2022 2312 by Terra Andrews RN  Pain Management Interventions: (no pain meds due)   other (see comments)   quiet environment facilitated  Taken 5/31/2022 2242 by Terra Andrews RN  Pain Management Interventions: see MAR  Intervention: Provide Person-Centered Care  Recent Flowsheet Documentation  Taken 5/31/2022 2344 by Terra Andrews RN  Trust Relationship/Rapport:   care explained   choices provided   emotional support provided  Taken 5/31/2022 2000 by Terra Andrews RN  Trust Relationship/Rapport: care explained  Goal: Readiness for Transition of Care  Outcome: Ongoing, Progressing  Intervention: Mutually Develop Transition Plan  Recent Flowsheet Documentation  Taken 5/31/2022 2005 by Terra Andrews RN  Equipment Currently Used at Home: none  Transportation Anticipated: family or friend will provide  Patient/Family Anticipated Services at Transition: none  Patient/Family Anticipates Transition to: home   Goal Outcome Evaluation:

## 2022-06-02 LAB — BACTERIA SPEC AEROBE CULT: ABNORMAL

## 2022-06-02 NOTE — NURSING NOTE
Dr. Velez will escribe an increased cholesterol medication to the [facility] in [place] for stroke program compliance. To address elevated cholesterol.

## 2022-06-02 NOTE — CASE MANAGEMENT/SOCIAL WORK
Case Management Discharge Note      Final Note: DC Home    Provided Post Acute Provider List?: N/A  Provided Post Acute Provider Quality & Resource List?: N/A    Selected Continued Care - Discharged on 6/1/2022 Admission date: 5/31/2022 - Discharge disposition: Home or Self Care         Transportation Services  Private: Car (spouse)    Final Discharge Disposition Code: 01 - home or self-care     Jenny Song RN, MSN  Care Manager  [phone]

## 2022-06-04 LAB — QT INTERVAL: 455 MS

## 2022-06-05 LAB
BACTERIA SPEC AEROBE CULT: NORMAL
BACTERIA SPEC AEROBE CULT: NORMAL

## 2022-11-02 ENCOUNTER — OFFICE (OUTPATIENT)
Dept: URBAN - METROPOLITAN AREA CLINIC 64 | Facility: CLINIC | Age: 52
End: 2022-11-02

## 2022-11-02 VITALS
WEIGHT: 237 LBS | SYSTOLIC BLOOD PRESSURE: 128 MMHG | HEART RATE: 82 BPM | DIASTOLIC BLOOD PRESSURE: 89 MMHG | HEIGHT: 68 IN

## 2022-11-02 DIAGNOSIS — K21.9 GASTRO-ESOPHAGEAL REFLUX DISEASE WITHOUT ESOPHAGITIS: ICD-10-CM

## 2022-11-02 DIAGNOSIS — R12 HEARTBURN: ICD-10-CM

## 2022-11-02 DIAGNOSIS — R11.0 NAUSEA: ICD-10-CM

## 2022-11-02 DIAGNOSIS — K59.00 CONSTIPATION, UNSPECIFIED: ICD-10-CM

## 2022-11-02 PROCEDURE — 99213 OFFICE O/P EST LOW 20 MIN: CPT

## 2022-11-02 RX ORDER — PROMETHAZINE HYDROCHLORIDE 12.5 MG/1
TABLET ORAL
Qty: 90 | Refills: 0 | Status: ACTIVE

## 2022-11-02 RX ORDER — FAMOTIDINE 40 MG/1
40 TABLET, FILM COATED ORAL
Qty: 90 | Refills: 4 | Status: COMPLETED
Start: 2022-11-02 | End: 2023-06-07

## 2022-11-02 RX ORDER — NALOXEGOL OXALATE 25 MG/1
25 TABLET, FILM COATED ORAL
Qty: 30 | Refills: 11 | Status: COMPLETED
Start: 2022-11-02 | End: 2023-06-07

## 2022-11-02 RX ORDER — OMEPRAZOLE 40 MG/1
40 CAPSULE, DELAYED RELEASE ORAL
Qty: 90 | Refills: 8 | Status: COMPLETED
Start: 2022-11-02 | End: 2023-06-07

## 2023-06-07 ENCOUNTER — OFFICE (OUTPATIENT)
Dept: URBAN - METROPOLITAN AREA CLINIC 64 | Facility: CLINIC | Age: 53
End: 2023-06-07

## 2023-06-07 VITALS
SYSTOLIC BLOOD PRESSURE: 158 MMHG | HEART RATE: 98 BPM | DIASTOLIC BLOOD PRESSURE: 103 MMHG | WEIGHT: 244 LBS | HEIGHT: 68 IN

## 2023-06-07 DIAGNOSIS — Z87.891 PERSONAL HISTORY OF NICOTINE DEPENDENCE: ICD-10-CM

## 2023-06-07 DIAGNOSIS — F11.90 OPIOID USE, UNSPECIFIED, UNCOMPLICATED: ICD-10-CM

## 2023-06-07 DIAGNOSIS — K59.03 DRUG INDUCED CONSTIPATION: ICD-10-CM

## 2023-06-07 DIAGNOSIS — R11.0 NAUSEA: ICD-10-CM

## 2023-06-07 DIAGNOSIS — K21.9 GASTRO-ESOPHAGEAL REFLUX DISEASE WITHOUT ESOPHAGITIS: ICD-10-CM

## 2023-06-07 PROCEDURE — 99213 OFFICE O/P EST LOW 20 MIN: CPT

## 2023-06-07 RX ORDER — NALOXEGOL OXALATE 25 MG/1
25 TABLET, FILM COATED ORAL
Qty: 30 | Refills: 11 | Status: ACTIVE
Start: 2023-06-07

## 2023-06-07 RX ORDER — FAMOTIDINE 40 MG/1
40 TABLET, FILM COATED ORAL
Qty: 90 | Refills: 4 | Status: COMPLETED
Start: 2022-11-02 | End: 2023-06-07

## 2023-06-07 RX ORDER — PROMETHAZINE HYDROCHLORIDE 12.5 MG/1
TABLET ORAL
Qty: 90 | Refills: 0 | Status: ACTIVE

## 2024-12-11 ENCOUNTER — OFFICE (OUTPATIENT)
Dept: URBAN - METROPOLITAN AREA CLINIC 64 | Facility: CLINIC | Age: 54
End: 2024-12-11
Payer: MEDICAID

## 2024-12-11 VITALS
WEIGHT: 240 LBS | SYSTOLIC BLOOD PRESSURE: 108 MMHG | HEIGHT: 68 IN | DIASTOLIC BLOOD PRESSURE: 72 MMHG | HEART RATE: 83 BPM

## 2024-12-11 DIAGNOSIS — Z86.0100 PERSONAL HISTORY OF COLON POLYPS, UNSPECIFIED: ICD-10-CM

## 2024-12-11 DIAGNOSIS — R13.10 DYSPHAGIA, UNSPECIFIED: ICD-10-CM

## 2024-12-11 DIAGNOSIS — R14.0 ABDOMINAL DISTENSION (GASEOUS): ICD-10-CM

## 2024-12-11 DIAGNOSIS — R11.0 NAUSEA: ICD-10-CM

## 2024-12-11 DIAGNOSIS — K59.00 CONSTIPATION, UNSPECIFIED: ICD-10-CM

## 2024-12-11 PROCEDURE — 99214 OFFICE O/P EST MOD 30 MIN: CPT

## 2024-12-11 RX ORDER — PROMETHAZINE HYDROCHLORIDE 12.5 MG/1
50 TABLET ORAL
Qty: 20 | Refills: 1 | Status: ACTIVE
Start: 2024-12-11

## 2024-12-13 ENCOUNTER — TRANSCRIBE ORDERS (OUTPATIENT)
Dept: ADMINISTRATIVE | Facility: HOSPITAL | Age: 54
End: 2024-12-13
Payer: COMMERCIAL

## 2024-12-13 DIAGNOSIS — R11.0 NAUSEA: ICD-10-CM

## 2024-12-13 DIAGNOSIS — R15.9 FREQUENT FECAL INCONTINENCE: Primary | ICD-10-CM

## 2024-12-13 DIAGNOSIS — R13.10 DYSPHAGIA, UNSPECIFIED TYPE: ICD-10-CM

## 2024-12-13 DIAGNOSIS — K59.00 CONSTIPATION, UNSPECIFIED CONSTIPATION TYPE: ICD-10-CM

## 2024-12-13 DIAGNOSIS — Z86.0109 PERSONAL HISTORY OF OTHER COLON POLYPS: ICD-10-CM

## 2024-12-13 DIAGNOSIS — R14.0 BLOATING: ICD-10-CM
